# Patient Record
Sex: FEMALE | Race: BLACK OR AFRICAN AMERICAN | Employment: FULL TIME | URBAN - METROPOLITAN AREA
[De-identification: names, ages, dates, MRNs, and addresses within clinical notes are randomized per-mention and may not be internally consistent; named-entity substitution may affect disease eponyms.]

---

## 2019-08-05 ENCOUNTER — OFFICE VISIT (OUTPATIENT)
Dept: BEHAVIORAL/MENTAL HEALTH CLINIC | Age: 39
End: 2019-08-05

## 2019-08-05 VITALS — BODY MASS INDEX: 34.5 KG/M2 | WEIGHT: 188.6 LBS

## 2019-08-05 DIAGNOSIS — G20 PARKINSONIAN TREMOR (HCC): ICD-10-CM

## 2019-08-05 DIAGNOSIS — F31.74 BIPOLAR DISORDER, IN FULL REMISSION, MOST RECENT EPISODE MANIC (HCC): Primary | ICD-10-CM

## 2019-08-05 RX ORDER — HALOPERIDOL DECANOATE 100 MG/ML
INJECTION INTRAMUSCULAR
Qty: 5 VIAL | Refills: 2 | Status: SHIPPED | OUTPATIENT
Start: 2019-08-05 | End: 2019-12-09 | Stop reason: SDUPTHER

## 2019-08-05 RX ORDER — TRIHEXYPHENIDYL HYDROCHLORIDE 5 MG/1
5 TABLET ORAL 3 TIMES DAILY
Qty: 90 TAB | Refills: 3 | Status: SHIPPED | OUTPATIENT
Start: 2019-08-05 | End: 2019-11-24 | Stop reason: SDUPTHER

## 2019-08-05 RX ORDER — BENZTROPINE MESYLATE 2 MG/1
TABLET ORAL
Refills: 1 | COMMUNITY
Start: 2019-07-15 | End: 2019-08-05

## 2019-08-05 RX ORDER — TRIHEXYPHENIDYL HYDROCHLORIDE 5 MG/1
5 TABLET ORAL 3 TIMES DAILY
COMMUNITY
Start: 2019-08-01 | End: 2019-08-05 | Stop reason: SDUPTHER

## 2019-08-05 RX ORDER — HALOPERIDOL DECANOATE 100 MG/ML
INJECTION INTRAMUSCULAR
Refills: 2 | COMMUNITY
Start: 2019-06-10 | End: 2019-08-05 | Stop reason: SDUPTHER

## 2019-08-05 NOTE — PROGRESS NOTES
INITIAL PSYCHIATRIC EVALUATION    IDENTIFICATION:      A. Name: Mary Jacques. Age:     42 y.o.      C.   MRN: [id number]       D.   CSN:      [id number]      E. Admission Date: (Not on file)       EDDY   :     1980          SOURCE OF INFORMATION: The patient, past records        CHIEF COMPLAINT:  \" I need to get my medications here. \"      HPI: Miss Miriam Chun is a 41 y/o single ( but engaged) [facility] female, who has been struggling with mental illness since . She has been followed at the [facility] clinic by a Dr. Donnie Coreas and Damion Bennett who is her . She has had two hospitalizations per her Aric Ron who has been with her since . She becomes aggressive and talks non stop, becomes hyperactive and erratic. She has been controlled well on Haldol Decanoate 70mg every 4 weeks. Her last injection was two weeks ago, she is due for the next one on . 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 She has been there for the last 2.5 years. She wants to get  and wants me to refer her to a gynecologist because she wants to have children. She has never attempted suicide and denies any hallucinations. Current symptoms: tremors, constipation, history of aggressive behavior, spending too much money, being overtly active, and talking too fast, weight gain  Duration of symptoms: since       PHQ-9 scores:0/27  HAM-A scores:2/56 ( tremors,insomnia,constipation)  Mood Disorder Questionaire scores:2/13        STRESSORS:  Weight gain, wants to have children    PERSONAL COPING STYLEs :  Taking medication           REVIEW OF  PSYCHIATRIC SYSTEMS:  Patient did not meet criteria for a Major Depressive Disorder ( W OR W/O  psychotic features) PTSD, Obsessive Compulsive Disorder, Anxiety Disorder, Agoraphobia, EATING DISORDER,SUBSTANCE USE DISORDER,  Psychotic disorders or ASD.       PAST PSYCHIATRIC HISTORY:   Outpatient Psychiatric treatments:[facility]  and [facility] Bere, and Ms. Alfonso Chowdhury,  -[phone]  Suicide attempts/self inflictive behaviors:denied  Hospitalizations:  Two at [facility]  Medications Tried:  Cogentin, ? Haldol, artane  ECT:   none  Legal/Assault History:  none    PAST SUBSTANCE ABUSE HISTORY:  Unremarkable, does not smoke nor consume alcohol    FAMILY PSYCH HISTORY: Unremarkable       SOCIAL HISTORY: she came in to the [address] [place] [address],[address] in [address], has an 11th grade education, living with her Amy Soriano since 2003. 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 Does not have any children. PAST MEDICAL/SURGICAL HISTORY:  Unremarkable    Current Outpatient Medications   Medication Sig Dispense Refill    trihexyphenidyl (ARTANE) 5 mg tablet Take 1 Tab by mouth three (3) times daily. 90 Tab 3    haloperidol decanoate (HALDOL DECANOATE) 100 mg/mL injection INJECT 0.7ML INTRAMUSCULARLY EVERY FOUR WEEKS 5 Vial 2    ibuprofen (MOTRIN) 800 mg tablet Take 1 Tab by mouth every eight (8) hours as needed for Pain. 30 Tab 0    HYDROcodone-acetaminophen (NORCO) 5-325 mg per tablet Take 1 Tab by mouth every four (4) hours as needed for Pain. 20 Tab 0         Medical review of systems mainly considered within normal limits expect as noted in history above. Pertinent LABS:none available since 2013      ALLERGIES:   She has No Known Allergies.       MENTAL STATUS EXAM:  Orientation person, place, time/date, situation, day of week, month of year and year    Behavior/Eye contact: Good eye contact   Appearance:  Well kempt,appearing his/her age   Motor Behavior:  within normal limits   Speech:  normal pitch, normal volume and non-pressured   Thought Process: goal directed and within normal limits   Thought Content free of delusions and free of hallucinations   Suicidal ideations no plan  and no intention   Homicidal ideations no plan  and no intention   Mood:  stable   Affect:  euthymic   Memory recent  adequate Memory remote:  adequate   Concentration:  adequate   Abstraction:  not tested due to language barrier and limited education   Insight:  good   Reliability good   Perceptual disortions  Absent( auditory,visual,olfactory,tactile), patient denied         ASSESSMENT:  The patient is a 42 y.o. [place] female without any major symptoms at this time, she is stable on her current medications. There are no records for review, she was asked to jennifer a JUSTIN in order to obtain records from [facility] which she did. She denied any hallucinations and has managed to stay out of the hospital for several years taking the Haldol dec. 70mg/im q 4 weeks. NO tremors were appreciated but she is on Artane for that. Suicide/Homicide risk : (Nil,Low, Moderate, High): nil-low    PROVISIONAL DIAGNOSES:    ICD-10-CM ICD-9-CM   1. Bipolar disorder, in full remission, most recent episode manic ([facility] F31.74 296.46   2. Parkinsonian tremor (HCC) G20 332.0       Orders Placed This Encounter    trihexyphenidyl (ARTANE) 5 mg tablet     Sig: Take 1 Tab by mouth three (3) times daily. Dispense:  90 Tab     Refill:  3    haloperidol decanoate (HALDOL DECANOATE) 100 mg/mL injection     Sig: INJECT 0.7ML INTRAMUSCULARLY EVERY FOUR WEEKS     Dispense:  5 Vial     Refill:  2     Next injection due on August 14       TREATMENT PLAN:       1. Medications:                   She was provided with prescriptions to obtain Haldol Dec. 0.7ml IM for the August 14th and q 4 weeks along with Artane 5mg po tid prn. The risks and benefits of the proposed medications; the potential medication side effects and consequences of non-compliance were dicussed. The  patient was given opportunity to ask questions             2. Counseling/ psychotherapy:  Psych-education provided:none  Discussed rational versus irrational thinking patterns and their consequences. Discussed healthy/adaptive and unhealthy/maladaptive coping.     3.  Labs/ tests/ old records/ collateral: NA    4. Follow up : 5 weeks    5: If you feel suicidal after hours, please call the [address] [place] [address] @ [phone] OR GO TO THE NEAREST [address]. YOU MAY ALSO ACCESS THE SUICIDE HOTLINE @ \"SPEAKING OF SUICIDE. COM/RESOURCES\"    Referrals/Consults: Advised that records will be needed in order to better manage her condition and have her medications available. She agreed, JUSTIN signed. Ms. Mine Conner has a reminder for a \"due or due soon\" health maintenance. I have asked that she contact her primary care provider for follow-up on this health maintenance. TIME SPENT FACE TO FACE: 28 MINUTES                  SIGNED:    Sultana ALTON Duque MD,   Adult Psychiatrist/Psychosomatic Medicine  8/5/2019

## 2019-09-09 ENCOUNTER — OFFICE VISIT (OUTPATIENT)
Dept: BEHAVIORAL/MENTAL HEALTH CLINIC | Age: 39
End: 2019-09-09

## 2019-09-09 VITALS
HEIGHT: 62 IN | BODY MASS INDEX: 34.6 KG/M2 | WEIGHT: 188 LBS | HEART RATE: 86 BPM | DIASTOLIC BLOOD PRESSURE: 78 MMHG | SYSTOLIC BLOOD PRESSURE: 130 MMHG

## 2019-09-09 DIAGNOSIS — G20 PARKINSONIAN TREMOR (HCC): ICD-10-CM

## 2019-09-09 DIAGNOSIS — F31.74 BIPOLAR DISORDER, IN FULL REMISSION, MOST RECENT EPISODE MANIC (HCC): Primary | ICD-10-CM

## 2019-09-09 NOTE — PROGRESS NOTES
Psychiatric Outpatient Progress Note    Account Number:  [de-identified]  Name: Ling Avila    SUBJECTIVE:     CHIEF COMPLAINT:    HPI:  Ling Avila is a 42 y.o. female and was seen today for follow-up of psychiatric condition and psychotropic medication management. Miss Wallace Gilman is a 43 y/o single ( but engaged) [facility] female, who has been struggling with mental illness since 2006. She has been followed at the [facility] clinic by a Dr. Celine Maher and Mark Anthony Cordon who is her . She has had two hospitalizations per her Charol Goodell who has been with her since 2003. She becomes aggressive and talks non stop, becomes hyperactive and erratic. She has been controlled well on Haldol Decanoate 70mg every 4 weeks. Her last injection was two weeks ago, she is due for the next one on August 14th. 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 She wants to get  and wants me to refer her to a gynecologist because she wants to have children. She has never attempted suicide and denies any hallucinations. Current symptoms: tremors, constipation, history of aggressive behavior, spending too much money, being overtly active, and talking too fast, weight gain  Duration of symptoms: since 2006        PHQ-9 scores:0/27  HAM-A scores:2/56 ( tremors,insomnia,constipation)  Mood Disorder Questionaire scores:2/13     STRESSORS:  Weight gain, wants to have children     PERSONAL COPING STYLEs :  Taking medication            REVIEW OF  PSYCHIATRIC SYSTEMS:  Patient did not meet criteria for a Major Depressive Disorder ( W OR W/O  psychotic features) PTSD, Obsessive Compulsive Disorder, Anxiety Disorder, Agoraphobia, EATING DISORDER,SUBSTANCE USE DISORDER,  Psychotic disorders or ASD.        PAST PSYCHIATRIC HISTORY:   Outpatient Psychiatric treatments:[facility] STONE and MR , Lacey Ortiz, and Ms. Christi Ledbetter,  -[phone]  Suicide attempts/self inflictive behaviors:denied  Hospitalizations:  Two at [facility]  Medications Tried:  Cogentin, ? Haldol, artane  ECT:   none  Legal/Assault History:  none     PAST SUBSTANCE ABUSE HISTORY:  Unremarkable, does not smoke nor consume alcohol     FAMILY PSYCH HISTORY: Unremarkable        SOCIAL HISTORY: she came in to the [address] [place] [address],[address] in [address] [place] [address] Avenue, has an 11th grade education, living with her Janine Cos since 2003. 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 Does not have any children.     PAST MEDICAL/SURGICAL HISTORY:  Unremarkable   ---------------------------------------------------------------------------------------------------------------------------------------------    Course of events since last visit: Ms. John Foster returns with her fiance for her scheduled appointment. She has been having difficultly receiving her Haldol Dec. Injection of 70mg q 4 weeks. She had already purchased the ampules of Haldol dec. She is otherwise w/o symptoms and remains stable. She is desiring to have a baby and inquired about OB/Gyn specialists. Side Effects:  none      Fam/Social STATUS  OR changes: none     REVIEW OF SYSTEMS:  Pertinent items are noted in HPI. Visit Vitals  /78   Pulse 86   Ht 5' 2\" (1.575 m)   Wt 85.3 kg (188 lb)   BMI 34.39 kg/m²       OBJECTIVE:                 Mental Status exam: WNL except for      Attitude/Behavior  cooperative,     Eye Contact    appropriate   Appearance:  age appropriate and well dressed   Motor Behavior/Gait:  within normal limits   Speech:  normal pitch, normal volume and non-pressured   Thought Process: goal directed and within normal limits linear   Thought Content free of delusions and free of hallucinations   Perceptual distortions  Patient denied any visual,auditory,olfactory or gustatory hallucinations.  No illusions were reported or noted eithter   Suicidal ideations no plan  and no intention   Homicidal ideations no plan  and no intention Mood:  stable   Affect:  euthymic     Orientation/sensorium  Alert and oriented to  date,place, situation and persons   Memory recent  adequate   Memory remote:  adequate   Concentration:  adequate   Abstraction:  not tested   Insight:  intact   Reliability good   Judgment:  intact       MEDICAL DECISION MAKING:     Data REVIEWED: pertinent labs, imaging, medical records and diagnostic tests reviewed and incorporated in diagnosis and treatment plan    No Known Allergies     Current Outpatient Medications   Medication Sig Dispense Refill    trihexyphenidyl (ARTANE) 5 mg tablet Take 1 Tab by mouth three (3) times daily. 90 Tab 3    haloperidol decanoate (HALDOL DECANOATE) 100 mg/mL injection INJECT 0.7ML INTRAMUSCULARLY EVERY FOUR WEEKS 5 Vial 2    ibuprofen (MOTRIN) 800 mg tablet Take 1 Tab by mouth every eight (8) hours as needed for Pain. 30 Tab 0    HYDROcodone-acetaminophen (NORCO) 5-325 mg per tablet Take 1 Tab by mouth every four (4) hours as needed for Pain. 20 Tab 0          Problems addressed today:      ICD-10-CM ICD-9-CM    1. Bipolar disorder, in full remission, most recent episode [facility] ([facility] F31.74 296.46    2. Parkinsonian tremor ([facility] G20 332.0        No orders of the defined types were placed in this encounter. Assessment:   Gweneth Bloch  is a 42 y.o.  female  is  responding to treatment. Symptoms have remitted. She would like to have a baby and asked about OB/GYN specialists in this hospital.       Patient denies SI/HI/SIB    SUICIDE RISK:      Treatment PlanTreatment plan reviewed with patient-including diagnosis and medications:    1. Medication Changes/Adjustments: None, Will consider lowering the dose to 50mg q 4 weeks of the Haldol Dec. But she was administered  70mg IM of the Haldol Decanoate today due to logistical problems and fear of a relapse if she did not receive her injection. She has ample refills of the the Artane.      Current Outpatient Medications   Medication Sig Dispense Refill    trihexyphenidyl (ARTANE) 5 mg tablet Take 1 Tab by mouth three (3) times daily. 90 Tab 3    haloperidol decanoate (HALDOL DECANOATE) 100 mg/mL injection INJECT 0.7ML INTRAMUSCULARLY EVERY FOUR WEEKS 5 Vial 2    ibuprofen (MOTRIN) 800 mg tablet Take 1 Tab by mouth every eight (8) hours as needed for Pain. 30 Tab 0    HYDROcodone-acetaminophen (NORCO) 5-325 mg per tablet Take 1 Tab by mouth every four (4) hours as needed for Pain. 20 Tab 0             The risks, benefits of the proposed medication and the potential medication side effects ie dry mouth, weight gain, GI upset, headache,tremors, extrapyramidal side effects, sexual dysfunction, suicidal thoughts,sweating, etc.were discussed The patient was given the opportunity to ask questions. The patient was informed of the consequences of refusing medications and non-compliance. Patient agreed with this plan. LABORATORY ORDERS, REFERRALS:     Patient instructed to call or e-mail to [facility] with any side effects, questions or issues. PSYCHOTHERAPY:  approx 15 minutes  Supportive type as positive behaviors were reinforced, concerns were validated and support provided  Discussed rational versus irrational thinking patterns and their consequences. Discussed healthy/adaptive and unhealthy/maladaptive coping. Homework given regarding:   Psycho-education/ handouts provided:  none      Ms. Hidalgo has a reminder for a \"due or due soon\" health maintenance. I have asked that she contact her primary care provider for follow-up on this health maintenance. Liboiro Garsia is progressing. Follow-up and Dispositions    · Return in about 3 months (around 12/9/2019).            Kevin Earl MD  9/9/2019      TIME SPENT FACE TO FACE WITH THE PATIENT: 20 MINUTES    There are other unrelated non-urgent complaints, but due to the busy schedule and the amount of time I've already spent with her, time does not permit me to address these routine issues at today's visit. I've requested another appointment to review these additional issues.

## 2019-10-04 RX ORDER — HALOPERIDOL DECANOATE 100 MG/ML
70 INJECTION INTRAMUSCULAR ONCE
Status: COMPLETED | OUTPATIENT
Start: 2019-10-07 | End: 2019-10-07

## 2019-10-07 ENCOUNTER — HOSPITAL ENCOUNTER (OUTPATIENT)
Dept: INFUSION THERAPY | Age: 39
Discharge: HOME | End: 2019-10-07
Payer: COMMERCIAL

## 2019-10-07 VITALS
SYSTOLIC BLOOD PRESSURE: 133 MMHG | RESPIRATION RATE: 18 BRPM | TEMPERATURE: 97.3 F | DIASTOLIC BLOOD PRESSURE: 87 MMHG | HEART RATE: 60 BPM

## 2019-10-07 PROCEDURE — 74011250636 HC RX REV CODE- 250/636: Performed by: PSYCHIATRY & NEUROLOGY

## 2019-10-07 PROCEDURE — 96372 THER/PROPH/DIAG INJ SC/IM: CPT

## 2019-10-07 RX ADMIN — HALOPERIDOL DECANOATE 70 MG: 100 INJECTION INTRAMUSCULAR at 11:40

## 2019-10-07 NOTE — PROGRESS NOTES
Outpatient Infusion Center Short Visit Progress Note    1100 Pt admit to [facility] for Haldol Decanoate ambulatory in stable condition. Assessment completed. No new concerns voiced. Visit Vitals  /87 (BP 1 Location: Right arm, BP Patient Position: At rest)   Pulse 60   Temp 97.3 °F (36.3 °C)   Resp 18   Breastfeeding? No       Medications:  Haldol IM right arm    1140 Pt tolerated treatment well. D/c home ambulatory in no distress.  Pt aware of next appointment scheduled for 11/4/19

## 2019-10-29 RX ORDER — HALOPERIDOL DECANOATE 100 MG/ML
70 INJECTION INTRAMUSCULAR ONCE
Status: COMPLETED | OUTPATIENT
Start: 2019-11-04 | End: 2019-11-04

## 2019-11-04 ENCOUNTER — HOSPITAL ENCOUNTER (OUTPATIENT)
Dept: INFUSION THERAPY | Age: 39
Discharge: HOME | End: 2019-11-04
Payer: COMMERCIAL

## 2019-11-04 VITALS
SYSTOLIC BLOOD PRESSURE: 124 MMHG | TEMPERATURE: 97.5 F | DIASTOLIC BLOOD PRESSURE: 84 MMHG | RESPIRATION RATE: 18 BRPM | HEART RATE: 61 BPM

## 2019-11-04 PROCEDURE — 74011250636 HC RX REV CODE- 250/636: Performed by: PSYCHIATRY & NEUROLOGY

## 2019-11-04 PROCEDURE — 96372 THER/PROPH/DIAG INJ SC/IM: CPT

## 2019-11-04 RX ADMIN — HALOPERIDOL DECANOATE 70 MG: 100 INJECTION INTRAMUSCULAR at 10:42

## 2019-11-04 NOTE — PROGRESS NOTES
Outpatient Infusion Center Short Visit Progress Note    9891 Pt admit to [facility] for Haldol Decanoate ambulatory in stable condition. Assessment completed. No new concerns voiced. Visit Vitals  /84   Pulse 61   Temp 97.5 °F (36.4 °C)   Resp 18       Medications:  Haldol IM left arm    1050 Pt tolerated treatment well. D/c home ambulatory in no distress. Pt aware of next appointment scheduled for 12/02/19.

## 2019-11-24 DIAGNOSIS — G20 PARKINSONIAN TREMOR (HCC): ICD-10-CM

## 2019-11-25 RX ORDER — TRIHEXYPHENIDYL HYDROCHLORIDE 5 MG/1
TABLET ORAL
Qty: 90 TAB | Refills: 3 | Status: SHIPPED | OUTPATIENT
Start: 2019-11-25 | End: 2020-03-16

## 2019-12-09 ENCOUNTER — OFFICE VISIT (OUTPATIENT)
Dept: BEHAVIORAL/MENTAL HEALTH CLINIC | Age: 39
End: 2019-12-09

## 2019-12-09 VITALS
WEIGHT: 195 LBS | HEIGHT: 62 IN | HEART RATE: 78 BPM | DIASTOLIC BLOOD PRESSURE: 82 MMHG | SYSTOLIC BLOOD PRESSURE: 134 MMHG | BODY MASS INDEX: 35.88 KG/M2

## 2019-12-09 DIAGNOSIS — F31.74 BIPOLAR DISORDER, IN FULL REMISSION, MOST RECENT EPISODE MANIC (HCC): ICD-10-CM

## 2019-12-09 RX ORDER — HALOPERIDOL DECANOATE 100 MG/ML
INJECTION INTRAMUSCULAR
Qty: 5 VIAL | Refills: 2 | Status: SHIPPED | OUTPATIENT
Start: 2019-12-09 | End: 2019-12-09

## 2019-12-09 RX ORDER — HALOPERIDOL DECANOATE 100 MG/ML
INJECTION INTRAMUSCULAR
Qty: 5 VIAL | Refills: 2 | Status: SHIPPED | OUTPATIENT
Start: 2019-12-09 | End: 2020-05-22 | Stop reason: ALTCHOICE

## 2019-12-09 NOTE — PROGRESS NOTES
Psychiatric Outpatient Progress Note    Account Number:  [de-identified]  Name: Alberto Austin    SUBJECTIVE:     CHIEF COMPLAINT:    HPI:  Alberto Austin is a 42 y.o. female and was seen today for follow-up of psychiatric condition and psychotropic medication management. Miss Rachel Toledo is a 41 y/o single ( but engaged) [facility] female, who has been struggling with mental illness since 2006. She has been followed at the [facility] clinic by a Dr. Vivian Bassett and Caty Bowling who is her . She has had two hospitalizations per her Donna Ruchi who has been with her since 2003. She becomes aggressive and talks non stop, becomes hyperactive and erratic. She has been controlled well on Haldol Decanoate 70mg every 4 weeks. Her last injection was two weeks ago, she is due for the next one on August 14th. 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 She wants to get  and wants me to refer her to a gynecologist because she wants to have children. She has never attempted suicide and denies any hallucinations. Current symptoms: tremors, constipation, history of aggressive behavior, spending too much money, being overtly active, and talking too fast, weight gain  Duration of symptoms: since 2006        PHQ-9 scores:0/27  HAM-A scores:2/56 ( tremors,insomnia,constipation)  Mood Disorder Questionaire scores:2/13     STRESSORS:  Weight gain, wants to have children     PERSONAL COPING STYLEs :  Taking medication            REVIEW OF  PSYCHIATRIC SYSTEMS:  Patient did not meet criteria for a Major Depressive Disorder ( W OR W/O  psychotic features) PTSD, Obsessive Compulsive Disorder, Anxiety Disorder, Agoraphobia, EATING DISORDER,SUBSTANCE USE DISORDER,  Psychotic disorders or ASD.        PAST PSYCHIATRIC HISTORY:   Outpatient Psychiatric treatments:[facility] STONE and MR , Brie Patel, and Ms. Dequan Gomes,  -[phone]  Suicide attempts/self inflictive behaviors:denied  Hospitalizations:  Two at [facility]  Medications Tried:  Cogentin, ? Haldol, artane  ECT:   none  Legal/Assault History:  none     PAST SUBSTANCE ABUSE HISTORY:  Unremarkable, does not smoke nor consume alcohol     FAMILY PSYCH HISTORY: Unremarkable        SOCIAL HISTORY: she came in to the [address] [place] [address],[address] in [address] [place] [address], has an 11th grade education, living with her Bill Brooke since 2003. 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 Does not have any children.     PAST MEDICAL/SURGICAL HISTORY:  Unremarkable   ---------------------------------------------------------------------------------------------------------------------------------------------    Course of events since last visit: Ms. Rita William returns with her fiance for her scheduled appointment. She has been having difficultly receiving her Haldol Dec. Injection of 70mg q 4 weeks. She had already purchased the ampules of Haldol dec. She is otherwise w/o symptoms and remains stable. She is desiring to have a baby and inquired about OB/Gyn specialists. Side Effects:  none      Fam/Social STATUS  OR changes: none     REVIEW OF SYSTEMS:  Pertinent items are noted in HPI. Visit Vitals  /82 (BP 1 Location: Left arm, BP Patient Position: Sitting)   Pulse 78   Ht 5' 2\" (1.575 m)   Wt 88.5 kg (195 lb)   BMI 35.67 kg/m²       OBJECTIVE:                 Mental Status exam: WNL except for      Attitude/Behavior  cooperative,     Eye Contact    appropriate   Appearance:  age appropriate and well dressed   Motor Behavior/Gait:  within normal limits   Speech:  normal pitch, normal volume and non-pressured   Thought Process: goal directed and within normal limits linear   Thought Content free of delusions and free of hallucinations   Perceptual distortions  Patient denied any visual,auditory,olfactory or gustatory hallucinations.  No illusions were reported or noted eithter   Suicidal ideations no plan  and no intention   Homicidal ideations no plan  and no intention   Mood:  stable   Affect:  euthymic     Orientation/sensorium  Alert and oriented to  date,place, situation and persons   Memory recent  adequate   Memory remote:  adequate   Concentration:  adequate   Abstraction:  not tested   Insight:  intact   Reliability good   Judgment:  intact       MEDICAL DECISION MAKING:     Data REVIEWED: pertinent labs, imaging, medical records and diagnostic tests reviewed and incorporated in diagnosis and treatment plan    No Known Allergies     Current Outpatient Medications   Medication Sig Dispense Refill    haloperidol decanoate (HALDOL DECANOATE) 100 mg/mL injection INJECT 0.7ML INTRAMUSCULARLY EVERY FOUR WEEKS 5 Vial 2    trihexyphenidyl (ARTANE) 5 mg tablet TAKE 1 TABLET BY MOUTH 3 TIMES A DAY 90 Tab 3          Problems addressed today:      ICD-10-CM ICD-9-CM    1. Bipolar disorder, in full remission, most recent episode manic ([facility]) F31.74 296.46 haloperidol decanoate (HALDOL DECANOATE) 100 mg/mL injection       Orders Placed This Encounter    haloperidol decanoate (HALDOL DECANOATE) 100 mg/mL injection     Sig: INJECT 0.7ML INTRAMUSCULARLY EVERY FOUR WEEKS     Dispense:  5 Vial     Refill:  2     Her injection was due on Dec. 2 and therefore she needs Haldol Dec. administered today and every 4 weeks for the next 12 months. Assessment:   Carlos Rogers  is a 42 y.o.  female  is  responding to treatment. Symptoms have remitted. She would like to have a baby and asked about OB/GYN specialists in this hospital.       Patient denies SI/HI/SIB    SUICIDE RISK:      Treatment PlanTreatment plan reviewed with patient-including diagnosis and medications:    1. Medication Changes/Adjustments: None, she was administered  70mg IM of the Haldol Decanoate today due to logistical problems and fear of a relapse if she did not receive her injection.  She has ample refills of the the Artane but does not take it and is w/o any EPS.    Current Outpatient Medications   Medication Sig Dispense Refill    haloperidol decanoate (HALDOL DECANOATE) 100 mg/mL injection INJECT 0.7ML INTRAMUSCULARLY EVERY FOUR WEEKS 5 Vial 2    trihexyphenidyl (ARTANE) 5 mg tablet TAKE 1 TABLET BY MOUTH 3 TIMES A DAY 90 Tab 3             The risks, benefits of the proposed medication and the potential medication side effects ie dry mouth, weight gain, GI upset, headache,tremors, extrapyramidal side effects, sexual dysfunction, suicidal thoughts,sweating, etc.were discussed The patient was given the opportunity to ask questions. The patient was informed of the consequences of refusing medications and non-compliance. Patient agreed with this plan. LABORATORY ORDERS, REFERRALS: She is to receive her next Haldol Dec. 70mg injection on Jan. 6,2020 and every 4 weeks. Patient instructed to call or e-mail to [facility] with any side effects, questions or issues. PSYCHOTHERAPY:  approx 15 minutes  Supportive type as positive behaviors were reinforced, concerns were validated and support provided  Discussed rational versus irrational thinking patterns and their consequences. Discussed healthy/adaptive and unhealthy/maladaptive coping. Homework given regarding:   Psycho-education/ handouts provided:  PROVIDED HER WITH THE LIST OF [facility]N SPECIALISTS [facility]. Ms. Pro Elam has a reminder for a \"due or due soon\" health maintenance. I have asked that she contact her primary care provider for follow-up on this health maintenance. Whitney Nephew is progressing. Follow-up and Dispositions    · Return in about 6 months (around 6/9/2020). Sami Cobb MD  12/9/2019      TIME SPENT FACE TO FACE WITH THE PATIENT: 20 MINUTES    There are other unrelated non-urgent complaints, but due to the busy schedule and the amount of time I've already spent with her, time does not permit me to address these routine issues at today's visit.  I've requested another appointment to review these additional issues.

## 2019-12-24 RX ORDER — HALOPERIDOL DECANOATE 100 MG/ML
70 INJECTION INTRAMUSCULAR ONCE
Status: COMPLETED | OUTPATIENT
Start: 2019-12-30 | End: 2019-12-30

## 2019-12-30 ENCOUNTER — HOSPITAL ENCOUNTER (OUTPATIENT)
Dept: INFUSION THERAPY | Age: 39
Discharge: HOME | End: 2019-12-30
Payer: COMMERCIAL

## 2019-12-30 VITALS
RESPIRATION RATE: 16 BRPM | DIASTOLIC BLOOD PRESSURE: 75 MMHG | SYSTOLIC BLOOD PRESSURE: 130 MMHG | TEMPERATURE: 97.4 F | HEART RATE: 69 BPM

## 2019-12-30 PROCEDURE — 96372 THER/PROPH/DIAG INJ SC/IM: CPT

## 2019-12-30 PROCEDURE — 74011250636 HC RX REV CODE- 250/636: Performed by: PSYCHIATRY & NEUROLOGY

## 2019-12-30 RX ADMIN — HALOPERIDOL DECANOATE 70 MG: 100 INJECTION INTRAMUSCULAR at 12:53

## 2019-12-30 NOTE — PROGRESS NOTES
Outpatient Infusion Center Short Visit Progress Note    0652 Pt admit to [facility] for Haldol Decanoate ambulatory in stable condition. Assessment completed. No new concerns voiced. Visit Vitals  /75   Pulse 69   Temp 97.4 °F (36.3 °C)   Resp 16       Medications:  Haldol IM right arm    1300 Pt tolerated treatment well. D/c home ambulatory in no distress.      Future Appointments   Date Time Provider Pedrito Brii   1/27/2020 11:00 AM DEYSI FT CHAIR 2 [facility]   6/8/2020 10:30 AM Adrian Durán MD [address] [place] [address]

## 2020-01-22 RX ORDER — HALOPERIDOL DECANOATE 100 MG/ML
70 INJECTION INTRAMUSCULAR ONCE
Status: COMPLETED | OUTPATIENT
Start: 2020-01-27 | End: 2020-01-27

## 2020-01-27 ENCOUNTER — HOSPITAL ENCOUNTER (OUTPATIENT)
Dept: INFUSION THERAPY | Age: 40
Discharge: HOME | End: 2020-01-27
Payer: COMMERCIAL

## 2020-01-27 VITALS
TEMPERATURE: 97.9 F | RESPIRATION RATE: 16 BRPM | DIASTOLIC BLOOD PRESSURE: 82 MMHG | SYSTOLIC BLOOD PRESSURE: 129 MMHG | HEART RATE: 68 BPM

## 2020-01-27 PROCEDURE — 96372 THER/PROPH/DIAG INJ SC/IM: CPT

## 2020-01-27 PROCEDURE — 74011250636 HC RX REV CODE- 250/636: Performed by: PSYCHIATRY & NEUROLOGY

## 2020-01-27 RX ADMIN — HALOPERIDOL DECANOATE 70 MG: 100 INJECTION INTRAMUSCULAR at 11:46

## 2020-01-27 NOTE — PROGRESS NOTES
[facility] Short Note                       Date: 2020    Name: Mirna Alonzo    MRN: [id number]         : 1980      1100 Pt admit to [facility] for IM Haldol ambulatory in stable condition. Assessment completed. No new concerns voiced. Patient Vitals for the past 12 hrs:   Temp Pulse Resp BP   20 1101 97.9 °F (36.6 °C) 68 16 129/82     Right arm IM  Medications Administered     haloperidol decanoate (HALDOL DECANOATE) 100 mg/mL LA injection 70 mg     Admin Date  2020 Action  Given Dose  70 mg Route  IntraMUSCular Administered By  Olya Roland RN              Ms. Rohit Deal was discharged from [place] [address] in stable condition at 1150. Pt tolerated injection well. Patient D/C ambulatory home in no distress.     Future Appointments   Date Time Provider Pedrito Teresa   2020 11:00 AM [facility]   3/23/2020 11:00 AM [facility]   2020 11:00 AM [facility]   2020 10:30 AM Ginger Durán MD [address] ISABELLA Plunkett  2020  12:47 PM

## 2020-02-19 RX ORDER — HALOPERIDOL DECANOATE 100 MG/ML
70 INJECTION INTRAMUSCULAR ONCE
Status: COMPLETED | OUTPATIENT
Start: 2020-02-24 | End: 2020-02-24

## 2020-02-24 ENCOUNTER — HOSPITAL ENCOUNTER (OUTPATIENT)
Dept: INFUSION THERAPY | Age: 40
Discharge: HOME | End: 2020-02-24
Payer: COMMERCIAL

## 2020-02-24 VITALS
RESPIRATION RATE: 18 BRPM | SYSTOLIC BLOOD PRESSURE: 130 MMHG | HEART RATE: 67 BPM | DIASTOLIC BLOOD PRESSURE: 77 MMHG | TEMPERATURE: 98 F

## 2020-02-24 PROCEDURE — 74011250636 HC RX REV CODE- 250/636: Performed by: PSYCHIATRY & NEUROLOGY

## 2020-02-24 PROCEDURE — 96372 THER/PROPH/DIAG INJ SC/IM: CPT

## 2020-02-24 RX ADMIN — HALOPERIDOL DECANOATE 70 MG: 100 INJECTION INTRAMUSCULAR at 11:55

## 2020-02-24 NOTE — PROGRESS NOTES
Outpatient Infusion Center Short Visit Progress Note    2000 Pt admit to [facility] for Haldol Decanoate ambulatory in stable condition. Assessment completed. No new concerns voiced. Visit Vitals  /77 (BP 1 Location: Right arm, BP Patient Position: At rest)   Pulse 67   Temp 98 °F (36.7 °C)   Resp 18   Breastfeeding No       Medications:  Haldol IM Left arm    1155 Pt tolerated treatment well. D/c home ambulatory in no distress.  Pt aware of next appointment scheduled for 3/23/20

## 2020-03-15 DIAGNOSIS — G20 PARKINSONIAN TREMOR (HCC): ICD-10-CM

## 2020-03-16 RX ORDER — TRIHEXYPHENIDYL HYDROCHLORIDE 5 MG/1
TABLET ORAL
Qty: 90 TAB | Refills: 0 | Status: SHIPPED | OUTPATIENT
Start: 2020-03-16 | End: 2020-04-06 | Stop reason: SDUPTHER

## 2020-03-18 RX ORDER — HALOPERIDOL DECANOATE 100 MG/ML
70 INJECTION INTRAMUSCULAR ONCE
Status: COMPLETED | OUTPATIENT
Start: 2020-03-23 | End: 2020-03-23

## 2020-03-23 ENCOUNTER — HOSPITAL ENCOUNTER (OUTPATIENT)
Dept: INFUSION THERAPY | Age: 40
Discharge: HOME | End: 2020-03-23
Payer: COMMERCIAL

## 2020-03-23 PROCEDURE — 74011250636 HC RX REV CODE- 250/636: Performed by: PSYCHIATRY & NEUROLOGY

## 2020-03-23 PROCEDURE — 96372 THER/PROPH/DIAG INJ SC/IM: CPT

## 2020-03-23 RX ADMIN — HALOPERIDOL DECANOATE 70 MG: 100 INJECTION INTRAMUSCULAR at 11:52

## 2020-03-23 NOTE — PROGRESS NOTES
1110 Pt admit to [facility] for Haldol ambulatory in stable condition. Assessment completed. No new concerns voiced. Visit Vitals  BP (P) 129/86   Pulse (P) 68   Temp (P) 97.5 °F (36.4 °C)   Resp (P) 18       Medications:  Medications Administered     haloperidol decanoate (HALDOL DECANOATE) 100 mg/mL LA injection 70 mg     Admin Date  03/23/2020 Action  Given Dose  70 mg Route  IntraMUSCular Administered By  Jeannine Prader, RN              SQ right arm    1155 Pt tolerated treatment well. D/c home ambulatory in no distress. Pt aware of next [facility] appointment scheduled for 4/20/2020.

## 2020-04-06 DIAGNOSIS — G20 PARKINSONIAN TREMOR (HCC): ICD-10-CM

## 2020-04-06 RX ORDER — TRIHEXYPHENIDYL HYDROCHLORIDE 5 MG/1
TABLET ORAL
Qty: 90 TAB | Refills: 0 | Status: SHIPPED | OUTPATIENT
Start: 2020-04-06 | End: 2020-05-05 | Stop reason: SDUPTHER

## 2020-04-06 NOTE — TELEPHONE ENCOUNTER
Luis Armando patient   Phone    [phone]  Pt would like a refill ok now for her Artane  She is worried about being quarantined to her house and not being able to get the refill due next Wednesday   Pharmacy info on file is correct

## 2020-04-14 RX ORDER — HALOPERIDOL DECANOATE 100 MG/ML
70 INJECTION INTRAMUSCULAR ONCE
Status: COMPLETED | OUTPATIENT
Start: 2020-04-20 | End: 2020-04-20

## 2020-04-20 ENCOUNTER — HOSPITAL ENCOUNTER (OUTPATIENT)
Dept: INFUSION THERAPY | Age: 40
Discharge: HOME | End: 2020-04-20
Payer: COMMERCIAL

## 2020-04-20 VITALS
HEART RATE: 72 BPM | SYSTOLIC BLOOD PRESSURE: 110 MMHG | TEMPERATURE: 97.2 F | RESPIRATION RATE: 18 BRPM | DIASTOLIC BLOOD PRESSURE: 78 MMHG

## 2020-04-20 PROCEDURE — 96372 THER/PROPH/DIAG INJ SC/IM: CPT

## 2020-04-20 PROCEDURE — 74011250636 HC RX REV CODE- 250/636: Performed by: PSYCHIATRY & NEUROLOGY

## 2020-04-20 RX ADMIN — HALOPERIDOL DECANOATE 70 MG: 100 INJECTION INTRAMUSCULAR at 11:24

## 2020-04-20 NOTE — ROUTINE PROCESS
1050 Pt admit to [facility] for Haldol Injection ambulatory in stable condition. Assessment completed. No new concerns voiced. Visit Vitals /78 Pulse 72 Temp 97.2 °F (36.2 °C) Resp 18 Breastfeeding No  
 
 
Medications: 
Medications Administered   
 haloperidol decanoate (HALDOL DECANOATE) 100 mg/mL LA injection 70 mg   
 Admin Date 
04/20/2020 Action Given Dose 
70 mg Route IntraMUSCular Administered By 
Jose Bose, ISABELLA  
  
  
  
 
IM left arm 
 
1125 Pt tolerated treatment well. D/c home ambulatory in no distress. Pt aware of next [facility] appointment scheduled for 5/18/2020.

## 2020-05-04 DIAGNOSIS — G20 PARKINSONIAN TREMOR (HCC): ICD-10-CM

## 2020-05-05 RX ORDER — TRIHEXYPHENIDYL HYDROCHLORIDE 5 MG/1
TABLET ORAL
Qty: 180 TAB | Refills: 0 | Status: SHIPPED | OUTPATIENT
Start: 2020-05-05 | End: 2020-05-08 | Stop reason: SDUPTHER

## 2020-05-07 DIAGNOSIS — G20 PARKINSONIAN TREMOR (HCC): ICD-10-CM

## 2020-05-07 RX ORDER — TRIHEXYPHENIDYL HYDROCHLORIDE 5 MG/1
TABLET ORAL
Qty: 90 TAB | Refills: 0 | OUTPATIENT
Start: 2020-05-07

## 2020-05-08 DIAGNOSIS — G20 PARKINSONIAN TREMOR (HCC): ICD-10-CM

## 2020-05-08 RX ORDER — TRIHEXYPHENIDYL HYDROCHLORIDE 5 MG/1
TABLET ORAL
Qty: 270 TAB | Refills: 0 | Status: SHIPPED | OUTPATIENT
Start: 2020-05-08 | End: 2020-05-22 | Stop reason: ALTCHOICE

## 2020-05-08 RX ORDER — TRIHEXYPHENIDYL HYDROCHLORIDE 5 MG/1
5 TABLET ORAL 3 TIMES DAILY
Qty: 90 TAB | Refills: 0 | Status: SHIPPED | OUTPATIENT
Start: 2020-05-08 | End: 2020-05-22 | Stop reason: SDUPTHER

## 2020-05-12 RX ORDER — HALOPERIDOL DECANOATE 100 MG/ML
70 INJECTION INTRAMUSCULAR ONCE
Status: COMPLETED | OUTPATIENT
Start: 2020-05-18 | End: 2020-05-18

## 2020-05-12 RX ORDER — SODIUM CHLORIDE 9 MG/ML
25 INJECTION, SOLUTION INTRAVENOUS CONTINUOUS
Status: DISCONTINUED | OUTPATIENT
Start: 2020-05-18 | End: 2020-05-19 | Stop reason: HOSPADM

## 2020-05-18 ENCOUNTER — HOSPITAL ENCOUNTER (OUTPATIENT)
Dept: INFUSION THERAPY | Age: 40
Discharge: HOME | End: 2020-05-18
Payer: COMMERCIAL

## 2020-05-18 VITALS
TEMPERATURE: 98.1 F | DIASTOLIC BLOOD PRESSURE: 72 MMHG | HEART RATE: 87 BPM | SYSTOLIC BLOOD PRESSURE: 112 MMHG | RESPIRATION RATE: 18 BRPM

## 2020-05-18 PROCEDURE — 96372 THER/PROPH/DIAG INJ SC/IM: CPT

## 2020-05-18 PROCEDURE — 74011250636 HC RX REV CODE- 250/636: Performed by: PSYCHIATRY & NEUROLOGY

## 2020-05-18 RX ADMIN — HALOPERIDOL DECANOATE 70 MG: 100 INJECTION INTRAMUSCULAR at 13:29

## 2020-05-18 NOTE — PROGRESS NOTES
Outpatient Infusion Center Progress Note    1300 Pt admit to [facility] for Haldol ambulatory in stable condition. Assessment completed. No new concerns voiced. Medication ordered from pharmacy. Visit Vitals  /72 (BP 1 Location: Right arm, BP Patient Position: Sitting)   Pulse 87   Temp 98.1 °F (36.7 °C)   Resp 18   Breastfeeding No       Medications Administered     haloperidol decanoate (HALDOL DECANOATE) 100 mg/mL LA injection 70 mg     Admin Date  05/18/2020 Action  Given Dose  70 mg Route  IntraMUSCular Administered By  Doretha Primrose, ISABELLA                9227 Pt tolerated treatment well. D/c home ambulatory in no distress. Pt aware of next appointment scheduled.     Idalia Garcia RN

## 2020-05-22 ENCOUNTER — VIRTUAL VISIT (OUTPATIENT)
Dept: BEHAVIORAL/MENTAL HEALTH CLINIC | Age: 40
End: 2020-05-22

## 2020-05-22 DIAGNOSIS — F31.74 BIPOLAR DISORDER, IN FULL REMISSION, MOST RECENT EPISODE MANIC (HCC): ICD-10-CM

## 2020-05-22 DIAGNOSIS — G20 PARKINSONIAN TREMOR (HCC): ICD-10-CM

## 2020-05-22 RX ORDER — HALOPERIDOL DECANOATE 100 MG/ML
70 INJECTION INTRAMUSCULAR
Qty: 10 VIAL | Refills: 1 | Status: SHIPPED | OUTPATIENT
Start: 2020-05-22

## 2020-05-22 RX ORDER — TRIHEXYPHENIDYL HYDROCHLORIDE 5 MG/1
5 TABLET ORAL 3 TIMES DAILY
Qty: 270 TAB | Refills: 1 | Status: SHIPPED | OUTPATIENT
Start: 2020-05-22 | End: 2020-06-04

## 2020-05-31 DIAGNOSIS — G20 PARKINSONIAN TREMOR (HCC): ICD-10-CM

## 2020-06-01 RX ORDER — TRIHEXYPHENIDYL HYDROCHLORIDE 5 MG/1
TABLET ORAL
Qty: 90 TAB | Refills: 0 | OUTPATIENT
Start: 2020-06-01

## 2020-06-02 ENCOUNTER — TELEPHONE (OUTPATIENT)
Dept: BEHAVIORAL/MENTAL HEALTH CLINIC | Age: 40
End: 2020-06-02

## 2020-06-02 NOTE — TELEPHONE ENCOUNTER
Called patient back. She says both medications \"are not working\" she \"just isn't feeling right\"    She did not receive the brand name as prescribed by provider on May 22. Pt will call pharmacy.

## 2020-06-04 ENCOUNTER — TELEPHONE (OUTPATIENT)
Dept: BEHAVIORAL/MENTAL HEALTH CLINIC | Age: 40
End: 2020-06-04

## 2020-06-04 DIAGNOSIS — T50.905A DRUG-INDUCED EXTRAPYRAMIDAL MOVEMENT DISORDER: Primary | ICD-10-CM

## 2020-06-04 DIAGNOSIS — G25.89 DRUG-INDUCED EXTRAPYRAMIDAL MOVEMENT DISORDER: Primary | ICD-10-CM

## 2020-06-04 RX ORDER — BENZTROPINE MESYLATE 1 MG/1
1 TABLET ORAL 2 TIMES DAILY
Qty: 60 TAB | Refills: 5 | Status: SHIPPED | OUTPATIENT
Start: 2020-06-04

## 2020-06-04 NOTE — TELEPHONE ENCOUNTER
Patient calls because she has decided she does not want to pay for brand Artane, but the generic does not work. She would like the medication changed. She says she used to take another medication (maybe cogentin?) and that might work now?

## 2020-06-04 NOTE — TELEPHONE ENCOUNTER
Julio Sullivan MD  You 2 hours ago (9:21 AM)      Artane was discontinued and Cogentin was ordered. Called patient back, no answer, left voicemail.

## 2020-06-10 RX ORDER — HALOPERIDOL DECANOATE 100 MG/ML
70 INJECTION INTRAMUSCULAR ONCE
Status: COMPLETED | OUTPATIENT
Start: 2020-06-15 | End: 2020-06-15

## 2020-06-15 ENCOUNTER — HOSPITAL ENCOUNTER (OUTPATIENT)
Dept: INFUSION THERAPY | Age: 40
Discharge: HOME | End: 2020-06-15
Payer: COMMERCIAL

## 2020-06-15 VITALS
RESPIRATION RATE: 18 BRPM | TEMPERATURE: 97.3 F | HEART RATE: 78 BPM | DIASTOLIC BLOOD PRESSURE: 91 MMHG | SYSTOLIC BLOOD PRESSURE: 148 MMHG

## 2020-06-15 PROCEDURE — 96372 THER/PROPH/DIAG INJ SC/IM: CPT

## 2020-06-15 PROCEDURE — 74011250636 HC RX REV CODE- 250/636: Performed by: PSYCHIATRY & NEUROLOGY

## 2020-06-15 RX ADMIN — HALOPERIDOL DECANOATE 70 MG: 100 INJECTION INTRAMUSCULAR at 11:54

## 2020-06-15 NOTE — PROGRESS NOTES
Outpatient Infusion Center Progress Note    1100 Pt admit to [facility] for monthly Haldol IM injection ambulatory in stable condition. Assessment completed. No new concerns voiced. Visit Vitals  BP (!) 148/91 (BP 1 Location: Left arm, BP Patient Position: Sitting)   Pulse 78   Temp 97.3 °F (36.3 °C)   Resp 18   Breastfeeding No       Medications:  Haldol IM to left deltoid    1155 Pt tolerated treatment well. D/c home ambulatory in no distress. Patient will be transferring providers as she received a large bill for her last visit.

## 2020-07-13 ENCOUNTER — APPOINTMENT (OUTPATIENT)
Dept: INFUSION THERAPY | Age: 40
End: 2020-07-13

## 2020-08-10 ENCOUNTER — APPOINTMENT (OUTPATIENT)
Dept: INFUSION THERAPY | Age: 40
End: 2020-08-10

## 2020-09-07 ENCOUNTER — APPOINTMENT (OUTPATIENT)
Dept: INFUSION THERAPY | Age: 40
End: 2020-09-07

## 2022-03-18 PROBLEM — F31.74 BIPOLAR DISORDER, IN FULL REMISSION, MOST RECENT EPISODE MANIC (HCC): Status: ACTIVE | Noted: 2019-08-05

## 2022-03-20 PROBLEM — G20 PARKINSONIAN TREMOR (HCC): Status: ACTIVE | Noted: 2019-08-05

## 2023-04-20 ENCOUNTER — OFFICE VISIT (OUTPATIENT)
Dept: URGENT CARE | Age: 43
End: 2023-04-20

## 2023-04-20 VITALS
HEART RATE: 98 BPM | BODY MASS INDEX: 38.41 KG/M2 | DIASTOLIC BLOOD PRESSURE: 106 MMHG | OXYGEN SATURATION: 96 % | SYSTOLIC BLOOD PRESSURE: 220 MMHG | RESPIRATION RATE: 18 BRPM | WEIGHT: 210 LBS | TEMPERATURE: 98.1 F

## 2023-04-20 DIAGNOSIS — R03.0 ELEVATED BLOOD PRESSURE READING: ICD-10-CM

## 2023-04-20 DIAGNOSIS — T16.2XXA FOREIGN BODY IN LEFT AUDITORY CANAL, INITIAL ENCOUNTER: Primary | ICD-10-CM

## 2023-04-20 NOTE — PROGRESS NOTES
Here for q tip tip stuck in left ear  Came off when cleaning just a few hours ago. No blood or pain  Feels well otherwise. History reviewed. No pertinent past medical history. History reviewed. No pertinent surgical history. History reviewed. No pertinent family history. Social History     Socioeconomic History    Marital status: SINGLE     Spouse name: Not on file    Number of children: Not on file    Years of education: Not on file    Highest education level: Not on file   Occupational History    Not on file   Tobacco Use    Smoking status: Never    Smokeless tobacco: Never   Substance and Sexual Activity    Alcohol use: No    Drug use: No    Sexual activity: Not on file   Other Topics Concern    Not on file   Social History Narrative    Not on file     Social Determinants of Health     Financial Resource Strain: Not on file   Food Insecurity: Not on file   Transportation Needs: Not on file   Physical Activity: Not on file   Stress: Not on file   Social Connections: Not on file   Intimate Partner Violence: Not on file   Housing Stability: Not on file                ALLERGIES: Patient has no known allergies. Review of Systems   All other systems reviewed and are negative. Vitals:    04/20/23 1908 04/20/23 1913   BP: (!) 228/109 (!) 220/106   Pulse: 98    Resp: 18    Temp: 98.1 °F (36.7 °C)    SpO2: 96%    Weight: 210 lb (95.3 kg)        Physical Exam  Vitals reviewed. Constitutional:       General: She is not in acute distress. Appearance: She is not ill-appearing or diaphoretic. Eyes:      Extraocular Movements: Extraocular movements intact. Cardiovascular:      Rate and Rhythm: Normal rate and regular rhythm. Pulmonary:      Effort: Pulmonary effort is normal.   Skin:     Capillary Refill: Capillary refill takes less than 2 seconds. Comments: Foreign body white q tip head in left canal. Post removal canal normal and TMs normal bilat.     Neurological:      Mental Status: She is alert and oriented to person, place, and time. Psychiatric:         Mood and Affect: Mood normal.         Behavior: Behavior normal.         Thought Content: Thought content normal.       MDM     Differential Diagnosis; Clinical Impression; Plan:       CLINICAL IMPRESSION:  (T16.2XXA) Foreign body in left auditory canal, initial encounter  (primary encounter diagnosis)  (R03.0) Elevated blood pressure reading    Plan:  Foreign body removed from left canal without any complication well tolerated  Elevated BP today. Asymptomatic and not CC today. Monitor and follow up with PCP within 1 month if still high. We have reviewed concerning signs/symptoms, normal vs abnormal progression of medical condition and when to seek immediate medical attention. Schedule with PCP or Urgent Care immediately for worsening or new symptoms.           Procedures

## 2023-05-10 RX ORDER — BENZTROPINE MESYLATE 1 MG/1
TABLET ORAL 2 TIMES DAILY
COMMUNITY
Start: 2020-06-04

## 2023-05-10 RX ORDER — HALOPERIDOL DECANOATE 100 MG/ML
INJECTION INTRAMUSCULAR
COMMUNITY
Start: 2020-05-22